# Patient Record
Sex: MALE | Race: WHITE
[De-identification: names, ages, dates, MRNs, and addresses within clinical notes are randomized per-mention and may not be internally consistent; named-entity substitution may affect disease eponyms.]

---

## 2018-01-09 ENCOUNTER — HOSPITAL ENCOUNTER (EMERGENCY)
Dept: HOSPITAL 17 - NEPC | Age: 23
Discharge: HOME | End: 2018-01-09
Payer: COMMERCIAL

## 2018-01-09 VITALS
DIASTOLIC BLOOD PRESSURE: 83 MMHG | HEART RATE: 81 BPM | RESPIRATION RATE: 18 BRPM | TEMPERATURE: 99.5 F | SYSTOLIC BLOOD PRESSURE: 139 MMHG | OXYGEN SATURATION: 96 %

## 2018-01-09 VITALS — WEIGHT: 194.01 LBS | BODY MASS INDEX: 27.77 KG/M2 | HEIGHT: 70 IN

## 2018-01-09 VITALS
OXYGEN SATURATION: 99 % | RESPIRATION RATE: 20 BRPM | DIASTOLIC BLOOD PRESSURE: 85 MMHG | HEART RATE: 130 BPM | SYSTOLIC BLOOD PRESSURE: 144 MMHG | TEMPERATURE: 98.2 F

## 2018-01-09 DIAGNOSIS — R94.31: ICD-10-CM

## 2018-01-09 DIAGNOSIS — T43.615A: ICD-10-CM

## 2018-01-09 DIAGNOSIS — F41.9: ICD-10-CM

## 2018-01-09 DIAGNOSIS — J45.909: ICD-10-CM

## 2018-01-09 DIAGNOSIS — R00.2: Primary | ICD-10-CM

## 2018-01-09 LAB
BASOPHILS # BLD AUTO: 0.1 TH/MM3 (ref 0–0.2)
BASOPHILS NFR BLD: 0.6 % (ref 0–2)
BUN SERPL-MCNC: 14 MG/DL (ref 7–18)
CALCIUM SERPL-MCNC: 9.2 MG/DL (ref 8.5–10.1)
CHLORIDE SERPL-SCNC: 104 MEQ/L (ref 98–107)
CREAT SERPL-MCNC: 1 MG/DL (ref 0.6–1.3)
EOSINOPHIL # BLD: 0 TH/MM3 (ref 0–0.4)
EOSINOPHIL NFR BLD: 0.3 % (ref 0–4)
ERYTHROCYTE [DISTWIDTH] IN BLOOD BY AUTOMATED COUNT: 12.6 % (ref 11.6–17.2)
GFR SERPLBLD BASED ON 1.73 SQ M-ARVRAT: 95 ML/MIN (ref 89–?)
GLUCOSE SERPL-MCNC: 95 MG/DL (ref 74–106)
HCO3 BLD-SCNC: 26.9 MEQ/L (ref 21–32)
HCT VFR BLD CALC: 44.7 % (ref 39–51)
HGB BLD-MCNC: 16.2 GM/DL (ref 13–17)
LYMPHOCYTES # BLD AUTO: 1.3 TH/MM3 (ref 1–4.8)
LYMPHOCYTES NFR BLD AUTO: 14.1 % (ref 9–44)
MCH RBC QN AUTO: 32.1 PG (ref 27–34)
MCHC RBC AUTO-ENTMCNC: 36.3 % (ref 32–36)
MCV RBC AUTO: 88.4 FL (ref 80–100)
MONOCYTE #: 0.7 TH/MM3 (ref 0–0.9)
MONOCYTES NFR BLD: 7.4 % (ref 0–8)
NEUTROPHILS # BLD AUTO: 7.4 TH/MM3 (ref 1.8–7.7)
NEUTROPHILS NFR BLD AUTO: 77.6 % (ref 16–70)
PLATELET # BLD: 184 TH/MM3 (ref 150–450)
PMV BLD AUTO: 8.6 FL (ref 7–11)
RBC # BLD AUTO: 5.06 MIL/MM3 (ref 4.5–5.9)
SODIUM SERPL-SCNC: 139 MEQ/L (ref 136–145)
TROPONIN I SERPL-MCNC: (no result) NG/ML (ref 0.02–0.05)
WBC # BLD AUTO: 9.5 TH/MM3 (ref 4–11)

## 2018-01-09 PROCEDURE — 84484 ASSAY OF TROPONIN QUANT: CPT

## 2018-01-09 PROCEDURE — 82550 ASSAY OF CK (CPK): CPT

## 2018-01-09 PROCEDURE — 93005 ELECTROCARDIOGRAM TRACING: CPT

## 2018-01-09 PROCEDURE — 85025 COMPLETE CBC W/AUTO DIFF WBC: CPT

## 2018-01-09 PROCEDURE — 71045 X-RAY EXAM CHEST 1 VIEW: CPT

## 2018-01-09 PROCEDURE — 99285 EMERGENCY DEPT VISIT HI MDM: CPT

## 2018-01-09 PROCEDURE — 80048 BASIC METABOLIC PNL TOTAL CA: CPT

## 2018-01-09 NOTE — PD
HPI


Chief Complaint:  Cardiac Complaint


Time Seen by Provider:  15:15


Travel History


International Travel<30 days:  No


Contact w/Intl Traveler<30days:  No


Traveled to known affect area:  No





History of Present Illness


HPI


22y male presents to the ED via [facility] complaining of heart palpitations that 

started around 1145.  Patient states that he drank some 'really strong coffee' 

today around 10:30 and started having the symptoms.  Patient admits to not 

eating prior to taking this coffee.  Patient states that he felt "disoriented" 

for a short amount of time and decided to call [facility].  Patient states he has 

been diagnosed with anxiety and hypoglycemia and has had similar episodes to 

this prior.  Patient states that his last episode was years ago and has not 

followed up with a primary care physician for this issue.  Currently, patient 

denies any chest pain, shortness of breath, heart palpitations.  Patient states 

he feels fine.  Patient denies recent travel, fractures, surgeries, clots, 

medical problems.  Patient denies any medication use.  Denies illicit substance 

use.





PFSH


Past Medical History


Asthma:  Yes


Anxiety:  Yes


Diminished Hearing:  No


Medical other:  Yes (HYPOGLYCEMIA)


Respiratory:  Yes (asthma as child)





Past Surgical History


Oral Surgery:  Yes





Social History


Alcohol Use:  No


Tobacco Use:  No


Substance Use:  Yes (THC)





Allergies-Medications


(Allergen,Severity, Reaction):  


Coded Allergies:  


     amoxicillin (Verified  Allergy, Unknown, swollen, 1/9/18)


     clavulanic acid (Verified  Allergy, Unknown, swollen, 1/9/18)


Reported Meds & Prescriptions





Reported Meds & Active Scripts


Active


No Active Prescriptions or Reported Medications    








Review of Systems


Except as stated in HPI:  all other systems reviewed are Neg





Physical Exam


Narrative


GENERAL: Well-developed well-nourished in no apparent distress


SKIN: Focused skin assessment warm/dry.


HEAD: Atraumatic. Normocephalic. 


EYES: Pupils equal and round. No scleral icterus. No injection or drainage. 


ENT: No nasal bleeding or discharge.  Mucous membranes pink and moist.


NECK: Trachea midline. No JVD.  No lymphadenopathy


CARDIOVASCULAR: Regular rate and rhythm.  No murmur appreciated.


RESPIRATORY: No accessory muscle use. Clear to auscultation. Breath sounds 

equal bilaterally. 


GASTROINTESTINAL: Abdomen soft, non-tender, nondistended. 


MUSCULOSKELETAL: No obvious deformities. No clubbing.  No cyanosis.  No edema. 


NEUROLOGICAL: Awake and alert. No obvious cranial nerve deficits.  Motor 

grossly within normal limits. Normal speech.


PSYCHIATRIC: Appropriate mood and affect; insight and judgment normal.





Data


Data


Last Documented VS





Vital Signs








  Date Time  Temp Pulse Resp B/P (MAP) Pulse Ox O2 Delivery O2 Flow Rate FiO2


 


1/9/18 14:29 99.5 81 18 139/83 (101) 96 Room Air  








Orders





 Orders


Electrocardiogram (1/9/18 13:46)


Complete Blood Count With Diff (1/9/18 13:46)


Basic Metabolic Panel (Bmp) (1/9/18 13:46)


Ckmb (Isoenzyme) Profile (1/9/18 13:46)


Troponin I (1/9/18 13:46)


Chest, Single Ap (1/9/18 13:46)


Iv Access Insert/Monitor (1/9/18 13:46)


Ecg Monitoring (1/9/18 13:46)


Oximetry (1/9/18 13:46)


Ed Discharge Order (1/9/18 15:33)





Labs





Laboratory Tests








Test


  1/9/18


13:50


 


White Blood Count 9.5 TH/MM3 


 


Red Blood Count 5.06 MIL/MM3 


 


Hemoglobin 16.2 GM/DL 


 


Hematocrit 44.7 % 


 


Mean Corpuscular Volume 88.4 FL 


 


Mean Corpuscular Hemoglobin 32.1 PG 


 


Mean Corpuscular Hemoglobin


Concent 36.3 % 


 


 


Red Cell Distribution Width 12.6 % 


 


Platelet Count 184 TH/MM3 


 


Mean Platelet Volume 8.6 FL 


 


Neutrophils (%) (Auto) 77.6 % 


 


Lymphocytes (%) (Auto) 14.1 % 


 


Monocytes (%) (Auto) 7.4 % 


 


Eosinophils (%) (Auto) 0.3 % 


 


Basophils (%) (Auto) 0.6 % 


 


Neutrophils # (Auto) 7.4 TH/MM3 


 


Lymphocytes # (Auto) 1.3 TH/MM3 


 


Monocytes # (Auto) 0.7 TH/MM3 


 


Eosinophils # (Auto) 0.0 TH/MM3 


 


Basophils # (Auto) 0.1 TH/MM3 


 


CBC Comment AUTO DIFF 


 


Differential Comment


  AUTO DIFF


CONFIRMED


 


Blood Urea Nitrogen 14 MG/DL 


 


Creatinine 1.00 MG/DL 


 


Random Glucose 95 MG/DL 


 


Calcium Level 9.2 MG/DL 


 


Sodium Level 139 MEQ/L 


 


Potassium Level 3.7 MEQ/L 


 


Chloride Level 104 MEQ/L 


 


Carbon Dioxide Level 26.9 MEQ/L 


 


Anion Gap 8 MEQ/L 


 


Estimat Glomerular Filtration


Rate 95 ML/MIN 


 


 


Total Creatine Kinase 75 U/L 


 


Troponin I


  LESS THAN 0.02


NG/ML











MDM


Medical Decision Making


Medical Screen Exam Complete:  Yes


Emergency Medical Condition:  Yes


Differential Diagnosis


Caffeine overdose, polysubstance use, hypoglycemia, panic attack


Narrative Course


22y male presents to the ED via [facility] complaining of heart palpitations that 

started around 1145.  Patient states that he drank some 'really strong coffee' 

today around 10:30 and started having the symptoms.  Patient admits to not 

eating prior to taking this coffee.  Patient states that he felt "disoriented" 

for a short amount of time and decided to call [facility].  Patient states he has 

been diagnosed with anxiety and hypoglycemia and has had similar episodes to 

this prior.  Patient states that his last episode was years ago and has not 

followed up with a primary care physician for this issue.  Currently, patient 

denies any chest pain, shortness of breath, heart palpitations.  Patient states 

he feels fine.  Patient denies recent travel, fractures, surgeries, clots, 

medical problems.  Patient denies any medication use.  Denies illicit substance 

use.


Vital signs stable.


Physical exam findings revealed a 22-year-old male resting comfortably in bed.





Laboratory Tests








Test


  1/9/18


13:50


 


White Blood Count 9.5 TH/MM3 


 


Red Blood Count 5.06 MIL/MM3 


 


Hemoglobin 16.2 GM/DL 


 


Hematocrit 44.7 % 


 


Mean Corpuscular Volume 88.4 FL 


 


Mean Corpuscular Hemoglobin 32.1 PG 


 


Mean Corpuscular Hemoglobin


Concent 36.3 % 


 


 


Red Cell Distribution Width 12.6 % 


 


Platelet Count 184 TH/MM3 


 


Mean Platelet Volume 8.6 FL 


 


Neutrophils (%) (Auto) 77.6 % 


 


Lymphocytes (%) (Auto) 14.1 % 


 


Monocytes (%) (Auto) 7.4 % 


 


Eosinophils (%) (Auto) 0.3 % 


 


Basophils (%) (Auto) 0.6 % 


 


Neutrophils # (Auto) 7.4 TH/MM3 


 


Lymphocytes # (Auto) 1.3 TH/MM3 


 


Monocytes # (Auto) 0.7 TH/MM3 


 


Eosinophils # (Auto) 0.0 TH/MM3 


 


Basophils # (Auto) 0.1 TH/MM3 


 


CBC Comment AUTO DIFF 


 


Differential Comment


  AUTO DIFF


CONFIRMED


 


Blood Urea Nitrogen 14 MG/DL 


 


Creatinine 1.00 MG/DL 


 


Random Glucose 95 MG/DL 


 


Calcium Level 9.2 MG/DL 


 


Sodium Level 139 MEQ/L 


 


Potassium Level 3.7 MEQ/L 


 


Chloride Level 104 MEQ/L 


 


Carbon Dioxide Level 26.9 MEQ/L 


 


Anion Gap 8 MEQ/L 


 


Estimat Glomerular Filtration


Rate 95 ML/MIN 


 


 


Total Creatine Kinase 75 U/L 


 


Troponin I


  LESS THAN 0.02


NG/ML





Chest x-ray without acute process.


There is no evidence of cardiac involvement.  Patient describes a history of 

increased caffeine intake without food.  Patient also states that he has had an 

episode of this previously and feels like it could be a panic attack.


Patient is otherwise healthy.  Patient denies any symptoms at this point.


Advised patient to return to the emergency room for persistent or worsening 

symptoms.


Follow up with [facility].





Diagnosis





 Primary Impression:  


 Adverse effect of caffeine


 Qualified Codes:  T43.615A - Adverse effect of caffeine, initial encounter


Referrals:  


[facility]


Patient Instructions:  General Instructions, Heart Palpitations (ED)





***Additional Instructions:  


Avoid excessive caffeine use.  If you choose to take caffeine, be sure you eat 

as this may cause similar symptoms as he previously had.


Follow up with [facility] within 1 week.


If your symptoms persist or worsen or return, return to the emergency 

department.


Scripts


No Active Prescriptions or Reported Meds


Disposition:  01 DISCHARGE HOME


Condition:  Stable











Caren Rosario Jan 9, 2018 15:33

## 2018-01-10 NOTE — EKG
Date Performed: 01/09/2018       Time Performed: 14:17:35

 

PTAGE:      22 years

 

EKG:      Sinus rhythm 

 

 WITH SINUS ARRHYTHMIA WITH SHORT PA INTERVAL NONSPECIFIC T-WAVE ABNORMALITY ABNORMAL ECG

 

PREVIOUS TRACING       : 11/30/2017 17.50 Compared to the previous tracing rate faster

 

DOCTOR:   Bertin Simons  Interpretating Date/Time  01/10/2018 22:07:45